# Patient Record
Sex: FEMALE | Race: WHITE | NOT HISPANIC OR LATINO | Employment: FULL TIME | URBAN - METROPOLITAN AREA
[De-identification: names, ages, dates, MRNs, and addresses within clinical notes are randomized per-mention and may not be internally consistent; named-entity substitution may affect disease eponyms.]

---

## 2022-12-09 VITALS
SYSTOLIC BLOOD PRESSURE: 166 MMHG | RESPIRATION RATE: 16 BRPM | OXYGEN SATURATION: 98 % | WEIGHT: 155 LBS | DIASTOLIC BLOOD PRESSURE: 99 MMHG | TEMPERATURE: 97.9 F | HEART RATE: 94 BPM | BODY MASS INDEX: 25.83 KG/M2 | HEIGHT: 65 IN

## 2022-12-09 PROCEDURE — 99284 EMERGENCY DEPT VISIT MOD MDM: CPT

## 2022-12-09 PROCEDURE — C9803 HOPD COVID-19 SPEC COLLECT: HCPCS

## 2022-12-10 ENCOUNTER — HOSPITAL ENCOUNTER (EMERGENCY)
Facility: CLINIC | Age: 57
Discharge: HOME | End: 2022-12-10
Attending: EMERGENCY MEDICINE | Admitting: EMERGENCY MEDICINE
Payer: COMMERCIAL

## 2022-12-10 ENCOUNTER — TELEPHONE (OUTPATIENT)
Dept: OPHTHALMOLOGY | Facility: CLINIC | Age: 57
End: 2022-12-10

## 2022-12-10 DIAGNOSIS — B02.30 HERPES ZOSTER OPHTHALMICUS OF RIGHT EYE: ICD-10-CM

## 2022-12-10 DIAGNOSIS — S05.01XA ABRASION OF RIGHT CORNEA, INITIAL ENCOUNTER: ICD-10-CM

## 2022-12-10 LAB
FLUAV RNA SPEC QL NAA+PROBE: NEGATIVE
FLUBV RNA RESP QL NAA+PROBE: NEGATIVE
RSV RNA SPEC NAA+PROBE: NEGATIVE
SARS-COV-2 RNA RESP QL NAA+PROBE: NEGATIVE

## 2022-12-10 PROCEDURE — 250N000013 HC RX MED GY IP 250 OP 250 PS 637: Performed by: EMERGENCY MEDICINE

## 2022-12-10 PROCEDURE — 87637 SARSCOV2&INF A&B&RSV AMP PRB: CPT | Performed by: EMERGENCY MEDICINE

## 2022-12-10 PROCEDURE — 250N000009 HC RX 250: Performed by: EMERGENCY MEDICINE

## 2022-12-10 RX ORDER — TETRACAINE HYDROCHLORIDE 5 MG/ML
2 SOLUTION OPHTHALMIC ONCE
Status: COMPLETED | OUTPATIENT
Start: 2022-12-10 | End: 2022-12-10

## 2022-12-10 RX ORDER — VALACYCLOVIR HYDROCHLORIDE 1 G/1
1000 TABLET, FILM COATED ORAL 3 TIMES DAILY
Qty: 21 TABLET | Refills: 0 | Status: SHIPPED | OUTPATIENT
Start: 2022-12-10 | End: 2022-12-17

## 2022-12-10 RX ORDER — VALACYCLOVIR HYDROCHLORIDE 1 G/1
1000 TABLET, FILM COATED ORAL ONCE
Status: COMPLETED | OUTPATIENT
Start: 2022-12-10 | End: 2022-12-10

## 2022-12-10 RX ORDER — TETRACAINE HYDROCHLORIDE 5 MG/ML
SOLUTION OPHTHALMIC
Status: DISCONTINUED
Start: 2022-12-10 | End: 2022-12-10

## 2022-12-10 RX ORDER — ERYTHROMYCIN 5 MG/G
0.5 OINTMENT OPHTHALMIC 4 TIMES DAILY
Qty: 10 G | Refills: 0 | Status: SHIPPED | OUTPATIENT
Start: 2022-12-10

## 2022-12-10 RX ORDER — ERYTHROMYCIN 5 MG/G
0.5 OINTMENT OPHTHALMIC 4 TIMES DAILY
Qty: 10 G | Refills: 0 | Status: SHIPPED | OUTPATIENT
Start: 2022-12-10 | End: 2022-12-10

## 2022-12-10 RX ADMIN — VALACYCLOVIR HYDROCHLORIDE 1000 MG: 1 TABLET, FILM COATED ORAL at 01:54

## 2022-12-10 RX ADMIN — TETRACAINE HYDROCHLORIDE 2 DROP: 5 SOLUTION OPHTHALMIC at 01:54

## 2022-12-10 ASSESSMENT — ENCOUNTER SYMPTOMS
COUGH: 0
FEVER: 0
CHILLS: 0
RHINORRHEA: 0

## 2022-12-10 ASSESSMENT — ACTIVITIES OF DAILY LIVING (ADL): ADLS_ACUITY_SCORE: 33

## 2022-12-10 NOTE — ED PROVIDER NOTES
"  History   Chief Complaint:  Rash     The history is provided by the patient.      Genia Urrutia is a 57 year old female with history of multiple sclerosis who presents with rash. Patient reports that she has a sensitive rash on the right side of her scalp and upper right of her forehead that she noticed this morning. She states that she did a virtual visit and was referred to the ED given concern for shingles. She notes that she has MS and is on diroximel fumarate. She adds that she had a headache throughout the first part of this week. No fever, chills, cough or rhinorrhea. She does admit to mild R. Eye tearing though denies significant pain.  Patient has history of right optic neuritis and is hand motion only in that eye at baseline.     Review of Systems   Constitutional: Negative for chills and fever.   HENT: Negative for rhinorrhea.    Respiratory: Negative for cough.    Skin: Positive for rash.   All other systems reviewed and are negative.    Allergies:  No Known Allergies    Medications:  Levothyroxine  diroximel fumarate      Past Medical History:     Multiple sclerosis  Right optic neuritis  Hypothyroidism      Past Surgical History:    Left hand surgery  D&C    Family History:    Father - COPD    Social History:  Presents alone  Presents via private vehicle    Physical Exam     Patient Vitals for the past 24 hrs:   BP Temp Pulse Resp SpO2 Height Weight   12/09/22 2241 (!) 166/99 -- -- -- -- 1.651 m (5' 5\") 70.3 kg (155 lb)   12/09/22 2238 -- 97.9  F (36.6  C) 94 16 98 % -- --       Physical Exam  Nursing note and vitals reviewed.  Constitutional: Well nourished. Resting comfortably.   Head: Vesicular rash to R. Scalp in V1 distribution with vesicular lesions to R. eyelid  Eyes: Conjunctiva normal.  Pupils are equal, round, and reactive to light.   - Mild R. Scleral injection; L. Sclera normal  - EOMI  - No foreign body with eversion of the lids  - +fluorescein uptake @ 6 o'clock to suggest " abrasion; no dendritic lesions, and no Jose sign  -Visual acuity: hand motion to right eye (baseline); 20/30 left eye  ENT: Nose normal. Mucous membranes pink and moist.  TM normal. No najera sign.   Neck: Normal range of motion.  CVS: Normal rate, regular rhythm.  Normal heart sounds.    Pulmonary: Lungs clear to auscultation bilaterally. No wheezes/rales/rhonchi.  GI: Abdomen soft. Nontender, nondistended.   MSK: Moves all extremities  Neuro: Alert. Follows simple commands. Finger to nose intact.   Skin: Skin is warm and dry. No rash noted.   Psychiatric: Normal affect.       Emergency Department Course   Emergency Department Course:     Reviewed:  I reviewed nursing notes, vitals, past medical history and Care Everywhere    Assessments:  1309 I obtained history and examined the patient as noted above.   1312 I rechecked the patient and performed an eye exam.     Consults:  6469 I consulted with Dr. Kirby, ophthalmology, regarding this patient.     Interventions:  Medications   fluorescein (FUL-PACO) 1 MG ophthalmic strip (has no administration in time range)   valACYclovir (VALTREX) tablet 1,000 mg (1,000 mg Oral Given 12/10/22 0154)   tetracaine (PONTOCAINE) 0.5 % ophthalmic solution 2 drop (2 drops Right Eye Given 12/10/22 0154)     Disposition:  The patient was discharged to home.     Impression & Plan   Medical Decision Making:  Genia Urrutia is a 57 year old female who presents for evaluation of painful rash on the right side of her forehead and scalp.  This is consistent clinically with herpes zoster ophthalmicus.  Will start valacyclovir for this.  No signs at this point of disseminated zoster.  Patient is overall well appearing though is immunocompromised. We discussed obtaining blood work though patient feels comfortable deferring at this time which I think is reasonable.  She expressed understanding of potential complications particularly given her immunocompromised state.  On exam she is  noted to have concerns for corneal abrasion; will initiate erythromycin ointment.  No dendritic lesions on slit lamp exam though given concern for herpes ophthalmicus close outpatient ophthalmology f/u to be arranged. Ophtho plans to call patient in AM to facilitate appointment. Recommended monitor for fever, increasing pain, or should symptoms worsen to promptly represent.  Close PCP f/u recommended as well. Tylenol/motrin PRN.     Diagnosis:    ICD-10-CM    1. Abrasion of right cornea, initial encounter  S05.01XA       2. Herpes zoster ophthalmicus of right eye  B02.30           Discharge Medications:  New Prescriptions    ERYTHROMYCIN (ROMYCIN) 5 MG/GM OPHTHALMIC OINTMENT    Place 0.5 inches into the right eye 4 times daily    VALACYCLOVIR (VALTREX) 1000 MG TABLET    Take 1 tablet (1,000 mg) by mouth 3 times daily for 7 days       Scribe Disclosure:  Annette MACDONALD, am serving as a scribe at 12:55 AM on 12/10/2022 to document services personally performed by Tessa Almazan DO based on my observations and the provider's statements to me.            Tessa Almazan DO  12/10/22 3378

## 2022-12-10 NOTE — ED TRIAGE NOTES
Pt presents for complaint of a rash on the right side of her scalp and upper right of her forehead. Pt states it has been progressively worsening through the afternoon and evening. Pt states painful to the touch. Denies hx of shingles. Hx of MS and on medications for this. ABC intact, A&Ox4.     Triage Assessment     Row Name 12/09/22 6941       Triage Assessment (Adult)    Airway WDL WDL       Respiratory WDL    Respiratory WDL WDL       Skin Circulation/Temperature WDL    Skin Circulation/Temperature WDL WDL       Cardiac WDL    Cardiac WDL WDL       Peripheral/Neurovascular WDL    Peripheral Neurovascular WDL WDL       Cognitive/Neuro/Behavioral WDL    Cognitive/Neuro/Behavioral WDL WDL

## 2022-12-10 NOTE — TELEPHONE ENCOUNTER
Telephone Note    I was contacted by Dr. Almazan from [facility] regarding this patient. The following information was obtained via phone call with this provider.      Patient is a 57-year-old female who presents with pain on the side of her head and her forehead. Vesicular rash at this time. No dendrites but she has corneal abrasion from itching her eye. History of multiple sclerosis and right optic neuritis. She was started on valtrex and erythromycin.    Patient has already been discharged and the ED would like to facilitate outpatient follow up. Will call the patient to schedule follow up.    Meryl Kirby MD  Ophthalmology, PGY-2

## 2022-12-12 ENCOUNTER — TELEPHONE (OUTPATIENT)
Dept: OPHTHALMOLOGY | Facility: CLINIC | Age: 57
End: 2022-12-12

## 2023-04-01 ENCOUNTER — HEALTH MAINTENANCE LETTER (OUTPATIENT)
Age: 58
End: 2023-04-01

## 2024-06-02 ENCOUNTER — HEALTH MAINTENANCE LETTER (OUTPATIENT)
Age: 59
End: 2024-06-02

## 2025-04-12 ENCOUNTER — HEALTH MAINTENANCE LETTER (OUTPATIENT)
Age: 60
End: 2025-04-12

## 2025-06-14 ENCOUNTER — HEALTH MAINTENANCE LETTER (OUTPATIENT)
Age: 60
End: 2025-06-14